# Patient Record
Sex: MALE | Race: WHITE | NOT HISPANIC OR LATINO | Employment: FULL TIME | URBAN - NONMETROPOLITAN AREA
[De-identification: names, ages, dates, MRNs, and addresses within clinical notes are randomized per-mention and may not be internally consistent; named-entity substitution may affect disease eponyms.]

---

## 2017-02-07 ENCOUNTER — OFFICE VISIT (OUTPATIENT)
Dept: URGENT CARE | Facility: PHYSICIAN GROUP | Age: 57
End: 2017-02-07
Payer: COMMERCIAL

## 2017-02-07 VITALS
TEMPERATURE: 100.3 F | DIASTOLIC BLOOD PRESSURE: 84 MMHG | OXYGEN SATURATION: 98 % | WEIGHT: 111 LBS | HEART RATE: 63 BPM | BODY MASS INDEX: 18.47 KG/M2 | SYSTOLIC BLOOD PRESSURE: 150 MMHG

## 2017-02-07 DIAGNOSIS — K04.7 DENTAL ABSCESS: ICD-10-CM

## 2017-02-07 PROCEDURE — 99203 OFFICE O/P NEW LOW 30 MIN: CPT | Performed by: NURSE PRACTITIONER

## 2017-02-07 RX ORDER — HYDROCODONE BITARTRATE AND ACETAMINOPHEN 5; 325 MG/1; MG/1
1-2 TABLET ORAL EVERY 6 HOURS PRN
Qty: 12 TAB | Refills: 0 | Status: SHIPPED | OUTPATIENT
Start: 2017-02-07 | End: 2019-03-30

## 2017-02-07 RX ORDER — CLINDAMYCIN HYDROCHLORIDE 300 MG/1
300 CAPSULE ORAL 4 TIMES DAILY
Qty: 40 CAP | Refills: 0 | Status: SHIPPED | OUTPATIENT
Start: 2017-02-07 | End: 2019-03-30

## 2017-02-07 ASSESSMENT — ENCOUNTER SYMPTOMS
HEADACHES: 0
SINUS PRESSURE: 0
VOMITING: 0
MYALGIAS: 0
NAUSEA: 0
CHILLS: 0
FEVER: 0

## 2017-02-07 NOTE — MR AVS SNAPSHOT
"        Matt Alicia   2017 6:05 PM   Office Visit   MRN: [id number]    Department:  [facility]   Dept Phone:  [phone]    Description:  Male : 1960   Provider:  KYLIE Lofton.           Reason for Visit     Dental Pain           Allergies as of 2017     Allergen Noted Reactions    [place] 2015   Anaphylaxis, Hives    RXN=whole life    Pcn [Penicillins] 2015   Rash     swelling  Rxn=whole life    Tree Nuts Food Allergy 2016   Anaphylaxis, Hives    Previously listed under \"other food\"      You were diagnosed with     Dental abscess   [450209]         Vital Signs     Blood Pressure Pulse Temperature Weight Oxygen Saturation Smoking Status    150/84 mmHg 63 37.9 °C (100.3 °F) 50.349 kg (111 lb) 98% Current Every Day Smoker      Basic Information     Date Of Birth Sex Race Ethnicity Preferred Language    1960 Male White Non- English      Your appointments     2017  9:00 AM   CT BODY WITH with VISTA CT 1   [facility]    [address] [place], [place] [address]ta [facility] [address] [phone]           Taking medications as regularly scheduled is strongly encouraged.  *For Abdominal CT-Patient needs to  oral contrast and instruction from the department at least 2 hours prior to exam. Patient may  contrast at any imaging facility.              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 Review all of your home medications and newly ordered " medications with your provider and/or pharmacist. Follow medication instructions as directed by your provider and/or pharmacist. Please keep your medication list with you and share with your provider. Update the information when medications are discontinued, doses are changed, or new medications (including over-the-counter products) are added; and carry medication information at all times in the event of emergency situations     Allergies:  STRAWBERRY - Anaphylaxis,Hives     PCN - Rash     TREE NUTS FOOD ALLERGY - Anaphylaxis,Hives               Medications  Valid as of: February 07, 2017 -  7:22 PM    Generic Name Brand Name Tablet Size Instructions for use    Clindamycin HCl (Cap) CLEOCIN 300 MG Take 1 Cap by mouth 4 times a day.        Hydrocodone-Acetaminophen (Tab) NORCO 5-325 MG Take 1 Tab by mouth every 6 hours as needed.        Hydrocodone-Acetaminophen (Tab) NORCO 5-325 MG Take 1-2 Tabs by mouth every 6 hours as needed.        .                 Medicines prescribed today were sent to:     [facility] [address] [place], [place] [address] [address] AT [place] [address] [address] [place] [address]A [facility] [address]    Phone: [phone] Fax: [phone]    Open 24 Hours?: No      Medication refill instructions:       If your prescription bottle indicates you have medication refills left, it is not necessary to call your provider’s office. Please contact your pharmacy and they will refill your medication.    If your prescription bottle indicates you do not have any refills left, you may request refills at any time through one of the following ways: The online [employer] system (except Urgent Care), by calling your provider’s office, or by asking your pharmacy to contact your provider’s office with a refill request. Medication refills are processed only during regular business hours and may not be available until the next business day. Your provider may request additional information or to have a  follow-up visit with you prior to refilling your medication.   *Please Note: Medication refills are assigned a new Rx number when refilled electronically. Your pharmacy may indicate that no refills were authorized even though a new prescription for the same medication is available at the pharmacy. Please request the medicine by name with the pharmacy before contacting your provider for a refill.           Stitch Fix Access Code: [email or web address]  Expires: 3/9/2017  7:22 PM    Stitch Fix  A secure, online tool to manage your health information     [employer]’s Stitch Fix® is a secure, online tool that connects you to your personalized health information from the privacy of your home -- day or night - making it very easy for you to manage your healthcare. Once the activation process is completed, you can even access your medical information using the Stitch Fix shad, which is available for free in the Apple Shad store or Google Play store.     Stitch Fix provides the following levels of access (as shown below):   My Chart Features   [facility] Primary Care Doctor [facility]  Specialists [facility]  Urgent  [facility]  Primary Care  Doctor   Email your healthcare team securely and privately 24/7 X X X    Manage appointments: schedule your next appointment; view details of past/upcoming appointments X      Request prescription refills. X      View recent personal medical records, including lab and immunizations X X X X   View health record, including health history, allergies, medications X X X X   Read reports about your outpatient visits, procedures, consult and ER notes X X X X   See your discharge summary, which is a recap of your hospital and/or ER visit that includes your diagnosis, lab results, and care plan. X X       How to register for Stitch Fix:  1. Go to  [email or web address].  2. Click on the Sign Up Now box, which takes you to the New Member Sign Up page. You will need to provide the following information:  a. Enter your  [employer] Access Code exactly as it appears at the top of this page. (You will not need to use this code after you’ve completed the sign-up process. If you do not sign up before the expiration date, you must request a new code.)   b. Enter your date of birth.   c. Enter your home email address.   d. Click Submit, and follow the next screen’s instructions.  3. Create a DevZuz ID. This will be your DevZuz login ID and cannot be changed, so think of one that is secure and easy to remember.  4. Create a [employer] password. You can change your password at any time.  5. Enter your Password Reset Question and Answer. This can be used at a later time if you forget your password.   6. Enter your e-mail address. This allows you to receive e-mail notifications when new information is available in DevZuz.  7. Click Sign Up. You can now view your health information.    For assistance activating your DevZuz account, call [phone]

## 2017-02-08 NOTE — PROGRESS NOTES
Subjective:      Matt Alicia is a 56 y.o. male who presents with Dental Pain            HPI Comments: Medications, Allergies and Prior Medical Hx reviewed and updated in [facility].with patient/family today     Hx of dental infections      Dental Pain   This is a new problem. The current episode started in the past 7 days. The problem occurs constantly. The problem has been gradually worsening. The pain is severe. Associated symptoms include facial pain. Pertinent negatives include no difficulty swallowing, fever, oral bleeding, sinus pressure or thermal sensitivity. He has tried NSAIDs for the symptoms. The treatment provided mild relief.       Review of Systems   Constitutional: Negative for fever and chills.   HENT: Negative for sinus pressure.    Gastrointestinal: Negative for nausea and vomiting.   Musculoskeletal: Negative for myalgias.   Neurological: Negative for headaches.          Objective:     /84 mmHg  Pulse 63  Temp(Src) 37.9 °C (100.3 °F)  Wt 50.349 kg (111 lb)  SpO2 98%     Physical Exam   Constitutional: He appears well-developed and well-nourished. No distress.   HENT:   Head: Normocephalic and atraumatic.   Mouth/Throat: Dental abscesses and dental caries present.       Facial swelling, dental decay    Eyes: Conjunctivae are normal. Pupils are equal, round, and reactive to light.   Neck: Neck supple.   Cardiovascular: Normal rate, regular rhythm and normal heart sounds.    Pulmonary/Chest: Effort normal and breath sounds normal. No respiratory distress.   Neurological: He is alert.   Awake, alert, answering questions appropriately, moving all extremeties   Skin: Skin is warm and dry. No rash noted.   Psychiatric: He has a normal mood and affect. His behavior is normal.   Vitals reviewed.              Assessment/Plan:       1. Dental abscess  clindamycin (CLEOCIN) 300 MG Cap    hydrocodone-acetaminophen (NORCO) 5-325 MG Tab per tablet         Do not drink alcohol or operate machinery with  this medication  Pt reviewed on [place]  Aware,  no remarkable controlled substance prescription documentation noted    Fluids, tylenol, ibuprofen, antibiotics as directed  Pt will go to the ER for worsening or changing symptoms as discussed, follow-up with your dentist at the next available appointment  Discharge instructions discussed with pt/family who verbalize understanding and agreement with poc

## 2017-02-13 ENCOUNTER — HOSPITAL ENCOUNTER (OUTPATIENT)
Dept: RADIOLOGY | Facility: MEDICAL CENTER | Age: 57
End: 2017-02-13
Attending: INTERNAL MEDICINE
Payer: COMMERCIAL

## 2017-02-13 DIAGNOSIS — C67.9 MALIGNANT NEOPLASM OF OTHER SPECIFIED SITES OF BLADDER: ICD-10-CM

## 2017-02-13 PROCEDURE — 71260 CT THORAX DX C+: CPT

## 2017-08-14 ENCOUNTER — HOSPITAL ENCOUNTER (OUTPATIENT)
Dept: RADIOLOGY | Facility: MEDICAL CENTER | Age: 57
End: 2017-08-14
Attending: INTERNAL MEDICINE
Payer: COMMERCIAL

## 2017-08-14 DIAGNOSIS — C67.9 MALIGNANT NEOPLASM OF URINARY BLADDER, UNSPECIFIED SITE (HCC): ICD-10-CM

## 2017-08-14 PROCEDURE — 700117 HCHG RX CONTRAST REV CODE 255: Performed by: INTERNAL MEDICINE

## 2017-08-14 PROCEDURE — 74177 CT ABD & PELVIS W/CONTRAST: CPT

## 2017-08-14 RX ADMIN — IOHEXOL 100 ML: 350 INJECTION, SOLUTION INTRAVENOUS at 10:12

## 2018-01-08 ENCOUNTER — NON-PROVIDER VISIT (OUTPATIENT)
Dept: URGENT CARE | Facility: PHYSICIAN GROUP | Age: 58
End: 2018-01-08

## 2018-01-08 DIAGNOSIS — Z02.1 PRE-EMPLOYMENT DRUG SCREENING: ICD-10-CM

## 2018-01-08 LAB
AMP AMPHETAMINE: NORMAL
COC COCAINE: NORMAL
INT CON NEG: NORMAL
INT CON POS: NORMAL
MET METHAMPHETAMINES: NORMAL
OPI OPIATES: NORMAL
PCP PHENCYCLIDINE: NORMAL
POC DRUG COMMENT 753798-OCCUPATIONAL HEALTH: NORMAL
THC: NORMAL

## 2018-01-08 PROCEDURE — 80305 DRUG TEST PRSMV DIR OPT OBS: CPT | Performed by: PHYSICIAN ASSISTANT

## 2019-03-30 ENCOUNTER — APPOINTMENT (OUTPATIENT)
Dept: RADIOLOGY | Facility: MEDICAL CENTER | Age: 59
End: 2019-03-30
Attending: EMERGENCY MEDICINE
Payer: COMMERCIAL

## 2019-03-30 ENCOUNTER — HOSPITAL ENCOUNTER (EMERGENCY)
Facility: MEDICAL CENTER | Age: 59
End: 2019-03-30
Attending: EMERGENCY MEDICINE
Payer: COMMERCIAL

## 2019-03-30 VITALS
HEART RATE: 65 BPM | WEIGHT: 101.85 LBS | SYSTOLIC BLOOD PRESSURE: 84 MMHG | DIASTOLIC BLOOD PRESSURE: 46 MMHG | TEMPERATURE: 97.7 F | OXYGEN SATURATION: 95 % | RESPIRATION RATE: 15 BRPM | HEIGHT: 64 IN | BODY MASS INDEX: 17.39 KG/M2

## 2019-03-30 DIAGNOSIS — N39.0 ACUTE UTI: ICD-10-CM

## 2019-03-30 LAB
ALBUMIN SERPL BCP-MCNC: 3.5 G/DL (ref 3.2–4.9)
ALBUMIN/GLOB SERPL: 1.2 G/DL
ALP SERPL-CCNC: 76 U/L (ref 30–99)
ALT SERPL-CCNC: 12 U/L (ref 2–50)
ANION GAP SERPL CALC-SCNC: 8 MMOL/L (ref 0–11.9)
APPEARANCE UR: ABNORMAL
AST SERPL-CCNC: 12 U/L (ref 12–45)
BACTERIA #/AREA URNS HPF: ABNORMAL /HPF
BASOPHILS # BLD AUTO: 0.6 % (ref 0–1.8)
BASOPHILS # BLD: 0.09 K/UL (ref 0–0.12)
BILIRUB SERPL-MCNC: 1.2 MG/DL (ref 0.1–1.5)
BILIRUB UR QL STRIP.AUTO: NEGATIVE
BUN SERPL-MCNC: 24 MG/DL (ref 8–22)
CALCIUM SERPL-MCNC: 8.6 MG/DL (ref 8.5–10.5)
CHLORIDE SERPL-SCNC: 106 MMOL/L (ref 96–112)
CO2 SERPL-SCNC: 20 MMOL/L (ref 20–33)
COLOR UR: YELLOW
CREAT SERPL-MCNC: 1.4 MG/DL (ref 0.5–1.4)
EOSINOPHIL # BLD AUTO: 0.01 K/UL (ref 0–0.51)
EOSINOPHIL NFR BLD: 0.1 % (ref 0–6.9)
EPI CELLS #/AREA URNS HPF: NEGATIVE /HPF
ERYTHROCYTE [DISTWIDTH] IN BLOOD BY AUTOMATED COUNT: 45.7 FL (ref 35.9–50)
GLOBULIN SER CALC-MCNC: 3 G/DL (ref 1.9–3.5)
GLUCOSE SERPL-MCNC: 106 MG/DL (ref 65–99)
GLUCOSE UR STRIP.AUTO-MCNC: NEGATIVE MG/DL
HCT VFR BLD AUTO: 33.6 % (ref 42–52)
HGB BLD-MCNC: 11 G/DL (ref 14–18)
HYALINE CASTS #/AREA URNS LPF: ABNORMAL /LPF
IMM GRANULOCYTES # BLD AUTO: 0.12 K/UL (ref 0–0.11)
IMM GRANULOCYTES NFR BLD AUTO: 0.8 % (ref 0–0.9)
KETONES UR STRIP.AUTO-MCNC: NEGATIVE MG/DL
LACTATE BLD-SCNC: 0.7 MMOL/L (ref 0.5–2)
LEUKOCYTE ESTERASE UR QL STRIP.AUTO: ABNORMAL
LIPASE SERPL-CCNC: 5 U/L (ref 11–82)
LYMPHOCYTES # BLD AUTO: 0.97 K/UL (ref 1–4.8)
LYMPHOCYTES NFR BLD: 6.4 % (ref 22–41)
MCH RBC QN AUTO: 30.9 PG (ref 27–33)
MCHC RBC AUTO-ENTMCNC: 32.7 G/DL (ref 33.7–35.3)
MCV RBC AUTO: 94.4 FL (ref 81.4–97.8)
MICRO URNS: ABNORMAL
MONOCYTES # BLD AUTO: 0.9 K/UL (ref 0–0.85)
MONOCYTES NFR BLD AUTO: 5.9 % (ref 0–13.4)
NEUTROPHILS # BLD AUTO: 13.07 K/UL (ref 1.82–7.42)
NEUTROPHILS NFR BLD: 86.2 % (ref 44–72)
NITRITE UR QL STRIP.AUTO: NEGATIVE
NRBC # BLD AUTO: 0 K/UL
NRBC BLD-RTO: 0 /100 WBC
PH UR STRIP.AUTO: 6 [PH]
PLATELET # BLD AUTO: 208 K/UL (ref 164–446)
PMV BLD AUTO: 10.8 FL (ref 9–12.9)
POTASSIUM SERPL-SCNC: 4.2 MMOL/L (ref 3.6–5.5)
PROT SERPL-MCNC: 6.5 G/DL (ref 6–8.2)
PROT UR QL STRIP: 30 MG/DL
RBC # BLD AUTO: 3.56 M/UL (ref 4.7–6.1)
RBC # URNS HPF: ABNORMAL /HPF
RBC UR QL AUTO: ABNORMAL
SODIUM SERPL-SCNC: 134 MMOL/L (ref 135–145)
SP GR UR STRIP.AUTO: 1.01
UROBILINOGEN UR STRIP.AUTO-MCNC: 0.2 MG/DL
WBC # BLD AUTO: 15.2 K/UL (ref 4.8–10.8)
WBC #/AREA URNS HPF: ABNORMAL /HPF

## 2019-03-30 PROCEDURE — 96365 THER/PROPH/DIAG IV INF INIT: CPT

## 2019-03-30 PROCEDURE — 87040 BLOOD CULTURE FOR BACTERIA: CPT

## 2019-03-30 PROCEDURE — 83605 ASSAY OF LACTIC ACID: CPT

## 2019-03-30 PROCEDURE — 74177 CT ABD & PELVIS W/CONTRAST: CPT

## 2019-03-30 PROCEDURE — 87077 CULTURE AEROBIC IDENTIFY: CPT

## 2019-03-30 PROCEDURE — 700105 HCHG RX REV CODE 258: Performed by: EMERGENCY MEDICINE

## 2019-03-30 PROCEDURE — 36415 COLL VENOUS BLD VENIPUNCTURE: CPT

## 2019-03-30 PROCEDURE — 99284 EMERGENCY DEPT VISIT MOD MDM: CPT

## 2019-03-30 PROCEDURE — 83690 ASSAY OF LIPASE: CPT

## 2019-03-30 PROCEDURE — 700111 HCHG RX REV CODE 636 W/ 250 OVERRIDE (IP): Performed by: EMERGENCY MEDICINE

## 2019-03-30 PROCEDURE — 87086 URINE CULTURE/COLONY COUNT: CPT

## 2019-03-30 PROCEDURE — 80053 COMPREHEN METABOLIC PANEL: CPT

## 2019-03-30 PROCEDURE — 81001 URINALYSIS AUTO W/SCOPE: CPT

## 2019-03-30 PROCEDURE — 85025 COMPLETE CBC W/AUTO DIFF WBC: CPT

## 2019-03-30 PROCEDURE — 87186 SC STD MICRODIL/AGAR DIL: CPT

## 2019-03-30 PROCEDURE — 700117 HCHG RX CONTRAST REV CODE 255: Performed by: EMERGENCY MEDICINE

## 2019-03-30 PROCEDURE — 302104 KIT,UROSTOMY 2-PIECE 2 1/4": Performed by: EMERGENCY MEDICINE

## 2019-03-30 RX ORDER — CEFDINIR 300 MG/1
300 CAPSULE ORAL 2 TIMES DAILY
Qty: 20 CAP | Refills: 0 | Status: SHIPPED | OUTPATIENT
Start: 2019-03-30 | End: 2019-07-09

## 2019-03-30 RX ORDER — SODIUM CHLORIDE 9 MG/ML
30 INJECTION, SOLUTION INTRAVENOUS ONCE
Status: COMPLETED | OUTPATIENT
Start: 2019-03-30 | End: 2019-03-30

## 2019-03-30 RX ADMIN — IOHEXOL 100 ML: 350 INJECTION, SOLUTION INTRAVENOUS at 08:17

## 2019-03-30 RX ADMIN — CEFTRIAXONE SODIUM 2 G: 2 INJECTION, POWDER, FOR SOLUTION INTRAMUSCULAR; INTRAVENOUS at 09:44

## 2019-03-30 RX ADMIN — SODIUM CHLORIDE 1386 ML: 9 INJECTION, SOLUTION INTRAVENOUS at 07:00

## 2019-03-30 ASSESSMENT — PAIN DESCRIPTION - DESCRIPTORS: DESCRIPTORS: ACHING

## 2019-03-30 NOTE — ED NOTES
Report from Night RN taken. Rounded on Pt. Discussed POC. Pt verbalized understanding. Urostomy bag changed. Pt denies any needs at this time. Call light in reach. Bed in low locked position.

## 2019-03-30 NOTE — ED TRIAGE NOTES
Matt Andrews Ravin  58 y.o. male  Chief Complaint   Patient presents with   • Weight Loss     partner states 120lbs last month, now 101, increased sleeping.    • Other     RLQ ostomy present with more than normal bulging at stoma-site, tissue pink and healthy. Pt has moderate leaking around site due to improper supply use and opening site.    • Flank Pain     R flank started 3 weeks ago off an on. Pt states he has beed hot and cold like a fever.        Pt amb to triage with steady gait for above complaint. Ostomy from bladder cancer, Pt states he has darker urine than normal, smells stronger x 3 weeks.   Pt is alert and oriented, speaking in full sentences, follows commands and responds appropriately to questions. NAD. Resp are even and unlabored.  Pt placed in lobby. Pt educated on triage process. Pt encouraged to alert staff for any changes.

## 2019-03-30 NOTE — ED NOTES
Pt ambulated independently with steady gait to room. Gowned, linked and attached. Labs drawn and sent, IV placed. ERP at bedside.

## 2019-03-30 NOTE — ED NOTES
Discussed POC with Pt. Pt verbalizes understanding. Pt denies any needs at this time. Pt's call light is within reach and bed is in low locked position.

## 2019-03-30 NOTE — ED NOTES
Discharged home with s/o. Pt understands follow up apt scheduled. Given prescription x 1 with indication. Return to ed with worsening symptoms.

## 2019-03-30 NOTE — ED PROVIDER NOTES
ED Provider Note    CHIEF COMPLAINT  Chief Complaint   Patient presents with   • Weight Loss     partner states 120lbs last month, now 101, increased sleeping.    • Other     RLQ ostomy present with more than normal bulging at stoma-site, tissue pink and healthy. Pt has moderate leaking around site due to improper supply use and opening site.    • Flank Pain     R flank started 3 weeks ago off an on. Pt states he has beed hot and cold like a fever.        HPI  Matt Alicia is a 58 y.o. male who presents for evaluation of abdominal pain and weight loss.  The patient is 3 years status post cystectomy and prostatectomy for bladder cancer.  He has a urostomy in place.  He has been having pain over the past 3 weeks ago.  It is off and on.  His significant other states that a month ago he weighed 120 pounds and today he weighs 101 pounds.  He thinks he has had some intermittent fevers associated with this he has had no vomiting but has had occasional diarrhea.  He is not sought medical care until now.  He meets sepsis criteria on arrival therefore the sepsis protocol is initiated.  He evidently has been eating very little and sleeping much more than usual.    REVIEW OF SYSTEMS  See HPI for further details. All other systems negative.    PAST MEDICAL HISTORY  Past Medical History:   Diagnosis Date   • Cancer (HCC) 4/2015    bladder   • Dental disorder     condition issue   • Urinary bladder disorder     cancer       FAMILY HISTORY  Family History   Problem Relation Age of Onset   • Family history unknown: Yes       SOCIAL HISTORY  Social History     Social History   • Marital status: Single     Spouse name: N/A   • Number of children: N/A   • Years of education: N/A     Social History Main Topics   • Smoking status: Current Every Day Smoker     Packs/day: 0.50     Years: 44.00     Types: Cigarettes   • Smokeless tobacco: Never Used   • Alcohol use Yes      Comment: once every 4 months   • Drug use: No   • Sexual  "activity: Not on file     Other Topics Concern   • Not on file     Social History Narrative   • No narrative on file       SURGICAL HISTORY  Past Surgical History:   Procedure Laterality Date   • CYSTECTOMY W/ILEO CONDUIT ROBOTIC N/A 8/4/2016    Procedure: CYSTECTOMY W/ILEO CONDUIT ROBOTIC FOR: RADICAL CYSTECTOMY;  Surgeon: Gopi Celaya M.D.;  Location: SURGERY [facility];  Service:    • CYSTOSCOPY  2/8/2016    Procedure: CYSTOSCOPY;  Surgeon: Len Hogue M.D.;  Location: SURGERY [facility];  Service:    • TRANS URETHRAL RESECTION BLADDER  2/8/2016    Procedure: TRANS URETHRAL RESECTION BLADDER tumor  ;  Surgeon: Len Hogue M.D.;  Location: SURGERY [facility];  Service:    • CYSTOSCOPY  10/13/2015    Procedure: CYSTOSCOPY;  Surgeon: Sidney Rowley M.D.;  Location: SURGERY [facility];  Service:    • TRANS URETHRAL RESECTION BLADDER  10/13/2015    Procedure: TRANS URETHRAL RESECTION BLADDER ;  Surgeon: Sidney Rowley M.D.;  Location: SURGERY [facility];  Service:    • CYSTOSCOPY  6/4/2015    Procedure: CYSTOSCOPY;  Surgeon: Sidney Rowley M.D.;  Location: SURGERY [facility];  Service:    • TRANS URETHRAL RESECTION BLADDER  6/4/2015    Procedure: TRANS URETHRAL RESECTION BLADDER TUMOR;  Surgeon: Sidney Rowlye M.D.;  Location: SURGERY [facility];  Service:    • OTHER ORTHOPEDIC SURGERY  1987    tibia and fibula repair    • TONSILLECTOMY         CURRENT MEDICATIONS  Home Medications     Reviewed by Poppy Camarena R.N. (Registered Nurse) on 03/30/19 at 0631  Med List Status: Complete   Medication Last Dose Status        Patient Ted Taking any Medications                       ALLERGIES  Allergies   Allergen Reactions   • Strawberry Anaphylaxis and Hives     RXN=whole life   • Pcn [Penicillins] Rash      swelling  Rxn=whole life   • Tree Nuts Food Allergy Anaphylaxis and Hives     Previously listed under \"other food\"       PHYSICAL EXAM  VITAL SIGNS: BP " "(!) 84/46   Pulse 75   Temp 37.1 °C (98.7 °F) (Temporal)   Resp 16   Ht 1.626 m (5' 4\")   Wt 46.2 kg (101 lb 13.6 oz)   SpO2 96%   BMI 17.48 kg/m²   Constitutional: Well developed, thin, No acute distress, Non-toxic appearance.   HENT: Normocephalic, Atraumatic, Oropharynx slightly dry.  Eyes:  EOMI, Conjunctiva normal, No discharge.   Cardiovascular: Normal heart rate, Normal rhythm, No murmurs, No rubs, No gallops.   Thorax & Lungs: Clear to auscultation without wheezes, rales, or rhonchi.   Abdomen: Soft, minimal diffuse tenderness without guarding or rebound.  Urostomy in place.  Skin: Warm, Dry.  Musculoskeletal: Good range of motion in all major joints.   Neurologic: Awake and alert, No focal deficits noted.         COURSE & MEDICAL DECISION MAKING  Pertinent Labs & Imaging studies reviewed. (See chart for details)  This is a 58-year-old here for evaluation of abdominal pain is been going on for the past 3 weeks.  He has a history of bladder cancer and is status post cystectomy and prostatectomy.  He has a neobladder in place which has been draining properly but states that it seems to be swollen.  Patient is afebrile.  He is afebrile and is not tachycardic.  He met sepsis criteria on arrival.  His lactate is actually come back at 0.7 and I do not believe he is septic.  White count is elevated at 15 with a differential of 86 polys and 6 lymphocytes.  Urine is positive for leukocyte esterase and blood and a microscopic shows packed WBCs, 5-10 RBCs and many bacteria.  Chemistries are unremarkable.  CT scan of the abdomen and pelvis shows increased volume of the subcutaneous component of the right lower quadrant neobladder which appears to account for the bulging noted at the right lower quadrant ostomy site.  No other acute abnormalities are noted.  I discussed the results of the studies with the patient.  Due to insurance issues he does not have a primary care provider.  He does have insurance.  I have " contacted our  and she will schedule the patient a primary care appointment in [place] where he lives.  He is treated with ceftriaxone here.  I believe he does have a urinary tract infection without sepsis.  I will provide him a prescription for Omnicef.  It appears he has an appointment scheduled in 3 days which I think is excellent follow-up.  He is given a discharge instruction sheet on UTIs.    FINAL IMPRESSION  1.  Acute UTI  2.   3.         Electronically signed by: Addi Kendall, 3/30/2019 6:49 AM

## 2019-03-30 NOTE — LETTER
4/1/2019               Matt Alicia  [address] [facility] [place] [address]        Dear Matt (MR#[id number])    This letter is sent in regards to your, recent visit to the [facility] Emergency Department on 3/30/2019.  During the visit, tests were performed to assist the physician in a medical diagnosis.  A review of those tests requires that we notify you of the following:    Your urine culture was POSITIVE for a bacteria called Escherichia coli. The antibiotic prescribed for you (cefdinir) should be active to treat this bacteria. IT IS IMPORTANT THAT YOU CONTINUE TAKING YOUR ANTIBIOTIC UNTIL IT IS FINISHED.      Please feel free to contact me at the number below if you have any questions or concerns. Thank you for your cooperation in the matter.    Sincerely,  ED Culture Follow-Up Staff  Marta Carbajal, PharmD    [facility], Emergency Department  [address] [place] [address]  [phone] (ED Culture Line)  [phone]

## 2019-04-01 LAB
BACTERIA UR CULT: ABNORMAL
BACTERIA UR CULT: ABNORMAL
SIGNIFICANT IND 70042: ABNORMAL
SITE SITE: ABNORMAL
SOURCE SOURCE: ABNORMAL

## 2019-04-01 NOTE — ED NOTES
ED Positive Culture Follow-up/Notification Note:    Date: 4/1/19     Patient seen in the ED on 3/30/2019 for abdominal pain and weight loss with urostomy in place.   1. Acute UTI       Discharge Medication List as of 3/30/2019 11:07 AM      START taking these medications    Details   cefdinir (OMNICEF) 300 MG Cap Take 1 Cap by mouth 2 times a day., Disp-20 Cap, R-0, Print Rx Paper             Allergies: Strawberry; Pcn [penicillins]; and Tree nuts food allergy     Vitals:    03/30/19 0830 03/30/19 0931 03/30/19 1030 03/30/19 1125   BP:       Pulse: 61 (!) 59 (!) 57 65   Resp: 15 16 16 15   Temp:    36.5 °C (97.7 °F)   TempSrc:    Temporal   SpO2: 97% 95% 97% 95%   Weight:       Height:           Final cultures:   Results     Procedure Component Value Units Date/Time    URINE CULTURE(NEW) [[id number]]  (Abnormal)  (Susceptibility) Collected:  03/30/19 0650    Order Status:  Completed Specimen:  Urine Updated:  04/01/19 1021     Significant Indicator POS (POS)     Source UR     Site -     Urine Culture - (A)      Escherichia coli  >100,000 cfu/mL   (A)    Culture & Susceptibility     ESCHERICHIA COLI     Antibiotic Sensitivity Microscan Unit Status    Ampicillin Resistant >16 mcg/mL Final    Method: LORETTA    Cefepime Sensitive <=8 mcg/mL Final    Method: LORETTA    Cefotaxime Sensitive <=2 mcg/mL Final    Method: LORETTA    Cefotetan Sensitive <=16 mcg/mL Final    Method: LORETTA    Ceftazidime Sensitive <=1 mcg/mL Final    Method: LORETTA    Ceftriaxone Sensitive <=8 mcg/mL Final    Method: LORETTA    Cefuroxime Sensitive <=4 mcg/mL Final    Method: LORETTA    Cephalothin Intermediate 16 mcg/mL Final    Method: LORETTA    Ciprofloxacin Resistant >2 mcg/mL Final    Method: LORETTA    Gentamicin Sensitive <=4 mcg/mL Final    Method: LORETTA    Levofloxacin Resistant >4 mcg/mL Final    Method: LORETTA    Nitrofurantoin Sensitive <=32 mcg/mL Final    Method: LORETTA    Pip/Tazobactam Sensitive <=16 mcg/mL Final    Method: LORETTA    Piperacillin Resistant >64 mcg/mL  "Final    Method: LORETTA    Tigecycline Sensitive <=2 mcg/mL Final    Method: LORETAT    Tobramycin Sensitive <=4 mcg/mL Final    Method: LORETTA    Trimeth/Sulfa Sensitive <=2/38 mcg/mL Final    Method: LORETTA                       BLOOD CULTURE [[id number]] Collected:  03/30/19 1044    Order Status:  Completed Specimen:  Blood from Peripheral Updated:  03/31/19 0750     Significant Indicator NEG     Source BLD     Site PERIPHERAL     Blood Culture No Growth    Note: Blood cultures are incubated for 5 days and  are monitored continuously.Positive blood cultures  are called to the RN and reported as soon as  they are identified.      Narrative:       Per Hospital Policy: Only change Specimen Src: to \"Line\" if  specified by physician order.    BLOOD CULTURE [[id number]] Collected:  03/30/19 1044    Order Status:  Completed Specimen:  Blood from Peripheral Updated:  03/31/19 0750     Significant Indicator NEG     Source BLD     Site PERIPHERAL     Blood Culture No Growth    Note: Blood cultures are incubated for 5 days and  are monitored continuously.Positive blood cultures  are called to the RN and reported as soon as  they are identified.      Narrative:       Per Hospital Policy: Only change Specimen Src: to \"Line\" if  specified by physician order.    URINALYSIS,CULTURE IF INDICATED [[id number]]  (Abnormal) Collected:  03/30/19 0650    Order Status:  Completed Specimen:  Urine from Urine, Suprapubic Updated:  03/30/19 0729     Color Yellow     Character Cloudy (A)     Specific Gravity 1.009     Ph 6.0     Glucose Negative mg/dL      Ketones Negative mg/dL      Protein 30 (A) mg/dL      Bilirubin Negative     Urobilinogen, Urine 0.2     Nitrite Negative     Leukocyte Esterase Large (A)     Occult Blood Moderate (A)     Micro Urine Req Microscopic          Plan:   Appropriate antibiotic therapy prescribed. No changes required based upon culture result. CrCl calculated 36 ml/min, so no need for dose adjustment.  Sent letter to patient " to notify of positive culture result and encourage compliance with prescribed antibiotics.     Marta Carbajal

## 2019-04-04 LAB
BACTERIA BLD CULT: NORMAL
BACTERIA BLD CULT: NORMAL
SIGNIFICANT IND 70042: NORMAL
SIGNIFICANT IND 70042: NORMAL
SITE SITE: NORMAL
SITE SITE: NORMAL
SOURCE SOURCE: NORMAL
SOURCE SOURCE: NORMAL

## 2019-07-09 ENCOUNTER — OFFICE VISIT (OUTPATIENT)
Dept: MEDICAL GROUP | Facility: PHYSICIAN GROUP | Age: 59
End: 2019-07-09
Payer: COMMERCIAL

## 2019-07-09 VITALS
BODY MASS INDEX: 18.1 KG/M2 | RESPIRATION RATE: 14 BRPM | TEMPERATURE: 98.9 F | HEART RATE: 54 BPM | HEIGHT: 64 IN | OXYGEN SATURATION: 96 % | WEIGHT: 106 LBS | DIASTOLIC BLOOD PRESSURE: 84 MMHG | SYSTOLIC BLOOD PRESSURE: 132 MMHG

## 2019-07-09 DIAGNOSIS — R25.2 LEG CRAMPS: ICD-10-CM

## 2019-07-09 DIAGNOSIS — C67.9 MALIGNANT NEOPLASM OF URINARY BLADDER, UNSPECIFIED SITE (HCC): ICD-10-CM

## 2019-07-09 DIAGNOSIS — Z00.00 HEALTHCARE MAINTENANCE: ICD-10-CM

## 2019-07-09 DIAGNOSIS — Z90.79 STATUS POST PROSTATECTOMY: ICD-10-CM

## 2019-07-09 DIAGNOSIS — F17.200 NICOTINE DEPENDENCE WITH CURRENT USE: ICD-10-CM

## 2019-07-09 DIAGNOSIS — Z93.6 PRESENCE OF UROSTOMY (HCC): ICD-10-CM

## 2019-07-09 DIAGNOSIS — Z13.6 SCREENING FOR CARDIOVASCULAR CONDITION: ICD-10-CM

## 2019-07-09 PROCEDURE — 99214 OFFICE O/P EST MOD 30 MIN: CPT | Performed by: NURSE PRACTITIONER

## 2019-07-09 ASSESSMENT — PAIN SCALES - GENERAL: PAINLEVEL: NO PAIN

## 2019-07-09 ASSESSMENT — PATIENT HEALTH QUESTIONNAIRE - PHQ9: CLINICAL INTERPRETATION OF PHQ2 SCORE: 0

## 2019-07-09 NOTE — PROGRESS NOTES
Chief Complaint   Patient presents with   • Follow-Up     [facility]   • Other     [facility]         This is a 58 y.o.male patient that presents today with the following: Establish care with new PCP    Presence of urostomy (HCC)  See additional notes, patient has urostomy in place.    Nicotine dependence with current use  Patient smokes on a daily basis, he understands the risks associated with this and adamantly declines assistance with cessation.  He also declines referral to the lung cancer screening program.    Malignant tumor of urinary bladder  Patient has been treated for malignant tumor of the bladder with complete cystectomy and prostatectomy and urostomy in place. Neobladder created. He is previously been followed by urology as well as oncology and no longer follows with them and does not wish to go back.  Explained to him the importance of at least annual follow-up, he continues to decline.    Healthcare maintenance  58-year-old male patient presents today to establish care with new PCP, he is status post ER visit for acute UTI at neobladder/urostomy site.  This is treated with IV antibiotics, he was advised at discharge to establish care with a new PCP for post hospital follow-up.  When going over health history and health maintenance patient appeared to this provider to be very disgruntled about the healthcare industry and refused all health maintenance and states that everything is unnecessarily expensive and he simply will not do any of the recommended screening and preventative care exams.  Tried to explain the importance of health maintenance to prolong life and well-being, but patient continues to decline.  I did order routine fasting labs, this provider not sure if he will have them done and he was advised to follow-up with me as needed.      No visits with results within 1 Month(s) from this visit.   Latest known visit with results is:   Admission on 03/30/2019, Discharged on 03/30/2019    Component Date Value   • Lipase 03/30/2019 5*   • Color 03/30/2019 Yellow    • Character 03/30/2019 Cloudy*   • Specific Gravity 03/30/2019 1.009    • Ph 03/30/2019 6.0    • Glucose 03/30/2019 Negative    • Ketones 03/30/2019 Negative    • Protein 03/30/2019 30*   • Bilirubin 03/30/2019 Negative    • Urobilinogen, Urine 03/30/2019 0.2    • Nitrite 03/30/2019 Negative    • Leukocyte Esterase 03/30/2019 Large*   • Occult Blood 03/30/2019 Moderate*   • Micro Urine Req 03/30/2019 Microscopic    • Sodium 03/30/2019 134*   • Potassium 03/30/2019 4.2    • Chloride 03/30/2019 106    • Co2 03/30/2019 20    • Anion Gap 03/30/2019 8.0    • Glucose 03/30/2019 106*   • Bun 03/30/2019 24*   • Creatinine 03/30/2019 1.40    • Calcium 03/30/2019 8.6    • AST(SGOT) 03/30/2019 12    • ALT(SGPT) 03/30/2019 12    • Alkaline Phosphatase 03/30/2019 76    • Total Bilirubin 03/30/2019 1.2    • Albumin 03/30/2019 3.5    • Total Protein 03/30/2019 6.5    • Globulin 03/30/2019 3.0    • A-G Ratio 03/30/2019 1.2    • Significant Indicator 03/30/2019 POS*   • Source 03/30/2019 UR    • Site 03/30/2019 -    • Urine Culture 03/30/2019 -*   • Urine Culture 03/30/2019 *                    Value:Escherichia coli  >100,000 cfu/mL     • WBC 03/30/2019 Packed*   • RBC 03/30/2019 5-10*   • Bacteria 03/30/2019 Many*   • Epithelial Cells 03/30/2019 Negative    • Hyaline Cast 03/30/2019 0-2    • GFR If  03/30/2019 >60    • GFR If Non  [employer]* 03/30/2019 52*   • WBC 03/30/2019 15.2*   • RBC 03/30/2019 3.56*   • Hemoglobin 03/30/2019 11.0*   • Hematocrit 03/30/2019 33.6*   • MCV 03/30/2019 94.4    • MCH 03/30/2019 30.9    • MCHC 03/30/2019 32.7*   • RDW 03/30/2019 45.7    • Platelet Count 03/30/2019 208    • MPV 03/30/2019 10.8    • Neutrophils-Polys 03/30/2019 86.20*   • Lymphocytes 03/30/2019 6.40*   • Monocytes 03/30/2019 5.90    • Eosinophils 03/30/2019 0.10    • Basophils 03/30/2019 0.60    • Immature Granulocytes 03/30/2019 0.80     • Nucleated RBC 03/30/2019 0.00    • Neutrophils (Absolute) 03/30/2019 13.07*   • Lymphs (Absolute) 03/30/2019 0.97*   • Monos (Absolute) 03/30/2019 0.90*   • Eos (Absolute) 03/30/2019 0.01    • Baso (Absolute) 03/30/2019 0.09    • Immature Granulocytes (a* 03/30/2019 0.12*   • NRBC (Absolute) 03/30/2019 0.00    • Lactic Acid 03/30/2019 0.7    • Significant Indicator 03/30/2019 NEG    • Source 03/30/2019 BLD    • Site 03/30/2019 PERIPHERAL    • Blood Culture 03/30/2019 No growth after 5 days of incubation.    • Significant Indicator 03/30/2019 NEG    • Source 03/30/2019 BLD    • Site 03/30/2019 PERIPHERAL    • Blood Culture 03/30/2019 No growth after 5 days of incubation.          clinical course has been stable    Past Medical History:   Diagnosis Date   • Cancer (HCC) 4/2015    bladder   • Dental disorder     condition issue   • Urinary bladder disorder     cancer       Past Surgical History:   Procedure Laterality Date   • CYSTECTOMY W/ILEO CONDUIT ROBOTIC N/A 8/4/2016    Procedure: CYSTECTOMY W/ILEO CONDUIT ROBOTIC FOR: RADICAL CYSTECTOMY;  Surgeon: Gopi Celaya M.D.;  Location: [facility];  Service:    • CYSTOSCOPY  2/8/2016    Procedure: CYSTOSCOPY;  Surgeon: Len Hogue M.D.;  Location: [facility];  Service:    • TRANS URETHRAL RESECTION BLADDER  2/8/2016    Procedure: TRANS URETHRAL RESECTION BLADDER tumor  ;  Surgeon: Len Hogue M.D.;  Location: [facility];  Service:    • CYSTOSCOPY  10/13/2015    Procedure: CYSTOSCOPY;  Surgeon: Sidney Rowley M.D.;  Location: [facility];  Service:    • TRANS URETHRAL RESECTION BLADDER  10/13/2015    Procedure: TRANS URETHRAL RESECTION BLADDER ;  Surgeon: Sidney Rowley M.D.;  Location: [facility];  Service:    • CYSTOSCOPY  6/4/2015    Procedure: CYSTOSCOPY;  Surgeon: Sidney Rowley M.D.;  Location: [facility];  Service:    • TRANS URETHRAL RESECTION BLADDER   "6/4/2015    Procedure: TRANS URETHRAL RESECTION BLADDER TUMOR;  Surgeon: Sidney Rowley M.D.;  Location: SUR[facility];  Service:    • OTHER ORTHOPEDIC SURGERY  1987    tibia and fibula repair    • TONSILLECTOMY         Family History   Problem Relation Age of Onset   • Lung Cancer Mother         COPD   • Cancer Father         bone   • Asthma Sister    • Cancer Brother         bone   • Diabetes Maternal Grandmother    • Alzheimer's Disease Paternal Grandmother    • Heart Failure Paternal Grandfather        Strawberry; Pcn [penicillins]; and Tree nuts food allergy    No current [facility]-ordered outpatient prescriptions on file.     No current [facility]-ordered facility-administered medications on file.        Constitutional ROS: No unexpected change in weight, No weakness, No unexplained fevers, sweats, or chills  Pulmonary ROS: No chronic cough, sputum, or hemoptysis, No shortness of breath, No recent change in breathing, Positive for smoker   Cardiovascular ROS: No chest pain, No edema, No palpitations  Gastrointestinal ROS: No abdominal pain, No nausea, vomiting, diarrhea, or constipation  Musculoskeletal/Extremities ROS: No clubbing, No peripheral edema, No pain, redness or swelling on the joints  Neurologic ROS: Normal development, No seizures, No weakness   ROS: Positive per HPI    Physical exam:  /84 (BP Location: Right arm, Patient Position: Sitting, BP Cuff Size: Adult)   Pulse (!) 54   Temp 37.2 °C (98.9 °F) (Temporal)   Resp 14   Ht 1.626 m (5' 4\")   Wt 48.1 kg (106 lb)   SpO2 96%   BMI 18.19 kg/m²   General Appearance: 58-year-old man appearing older than stated age alert, no distress, underweight, well-groomed  Skin: Skin color, texture, turgor normal. No rashes or lesions.  Lungs: negative findings: normal respiratory rate and rhythm, lungs clear to auscultation  Heart: negative. RRR without murmur, gallop, or rubs.  No ectopy.  Abdomen: Abdomen soft, non-tender. BS normal. No masses,  " No organomegaly.  Urostomy in place  Musculoskeletal: negative findings: no evidence of joint instability, no evidence of muscle atrophy, no deformities present  Neurologic: intact, CN II through XII grossly intact    Medical decision making/discussion: Patient declines all health maintenance due to perceived cost, offered referral to  to help locate resources, but he declines.  He was strongly encouraged to stop smoking, again he declines.  He is to otherwise follow-up with me annually and as needed.  Routine fasting labs were ordered, patient is not sure if he is going to have this done or not.  He does understand the risks associated with not having routine health maintenance exams done.    Matt was seen today for follow-up and other.    Diagnoses and all orders for this visit:    Nicotine dependence with current use    Malignant neoplasm of urinary bladder, unspecified site (HCC)  -     URINALYSIS,CULTURE IF INDICATED; Future    Presence of urostomy (HCC)  -     URINALYSIS,CULTURE IF INDICATED; Future    Leg cramps  -     CBC WITH DIFFERENTIAL; Future  -     FERRITIN; Future    Screening for cardiovascular condition  -     Lipid Profile; Future  -     Comp Metabolic Panel; Future    Healthcare maintenance    Status post prostatectomy        Return if symptoms worsen or fail to improve, for Follow-up, Discuss Labs.        Please note that this dictation was created using voice recognition software. I have made every reasonable attempt to correct obvious errors, but I expect that there are errors of grammar and possibly content that I did not discover before finalizing the note.

## 2019-07-10 PROBLEM — Z90.79 STATUS POST PROSTATECTOMY: Status: ACTIVE | Noted: 2019-07-10

## 2019-07-10 PROBLEM — Z00.00 HEALTHCARE MAINTENANCE: Status: ACTIVE | Noted: 2019-07-10

## 2019-07-10 NOTE — ASSESSMENT & PLAN NOTE
Patient smokes on a daily basis, he understands the risks associated with this and adamantly declines assistance with cessation.  He also declines referral to the lung cancer screening program.

## 2019-07-10 NOTE — ASSESSMENT & PLAN NOTE
58-year-old male patient presents today to establish care with new PCP, he is status post ER visit for acute UTI at neobladder/urostomy site.  This is treated with IV antibiotics, he was advised at discharge to establish care with a new PCP for post hospital follow-up.  When going over health history and health maintenance patient appeared to this provider to be very disgruntled about the healthcare industry and refused all health maintenance and states that everything is unnecessarily expensive and he simply will not do any of the recommended screening and preventative care exams.  Tried to explain the importance of health maintenance to prolong life and well-being, but patient continues to decline.  I did order routine fasting labs, this provider not sure if he will have them done and he was advised to follow-up with me as needed.

## 2019-07-10 NOTE — ASSESSMENT & PLAN NOTE
Patient has been treated for malignant tumor of the bladder with complete cystectomy and prostatectomy and urostomy in place. Neobladder created. He is previously been followed by urology as well as oncology and no longer follows with them and does not wish to go back.  Explained to him the importance of at least annual follow-up, he continues to decline.